# Patient Record
(demographics unavailable — no encounter records)

---

## 2024-12-23 NOTE — HISTORY OF PRESENT ILLNESS
[de-identified] : Age: 90 year F PMHx: HTN, HLD, Thyroid Disease Hand Dominance: RHD Chief Complaint: left hand ring trigger finger Trauma: Patient reports having pain and locking for one month. Patient reports having constant dull pain. Patient reports her finger "popping" into extension. Patient reports being able to use her hand with no restriction. Outside Imaging/Treatment: none OTC Medications: Tylenol PRN OT/PT: none Bracing: none Pain worse with: extension Pain better with: Tylenol

## 2024-12-23 NOTE — ASSESSMENT
[FreeTextEntry1] : EXAM Left hand with no swelling or erythema. Able to make fist, oppose thumb to small finger with good thenar bulk. No intrinsic atrophy. Tender along distal palmar crease, most notable at ring finger. No overt locking with catching palpable. Sensation intact throughout with <2sec cap refill.   ASSESSMENT & PLAN Left ring finger trigger - Discussed with patient the pathoanatomy of trigger and options going forward. Risks/benefits discussed as well as natural progression that injection will provide some relief but symptoms may recur. Discussed that pain may worsen in coming days but will subside. Discussed that we can repeat an injection or that operative intervention may be indicated down the line. After discussion of risks/benefits, including glucose intolerance in the diabetic population, patient elected to proceed with CSI to the A1 pulley.  Procedure 1 - 0.5cc 1% lidocaine + 0.5cc 40mg/cc Kenalog administered to the left ring finger A1 pulley following sterilization with Betadine. Patient tolerated this well.  F/u 3mo

## 2024-12-23 NOTE — HISTORY OF PRESENT ILLNESS
[de-identified] : Age: 90 year F PMHx: HTN, HLD, Thyroid Disease Hand Dominance: RHD Chief Complaint: left hand ring trigger finger Trauma: Patient reports having pain and locking for one month. Patient reports having constant dull pain. Patient reports her finger "popping" into extension. Patient reports being able to use her hand with no restriction. Outside Imaging/Treatment: none OTC Medications: Tylenol PRN OT/PT: none Bracing: none Pain worse with: extension Pain better with: Tylenol

## 2025-04-02 NOTE — PHYSICAL EXAM
[Right] : right knee [de-identified] : Constitutional: The patient appears well developed, well nourished. Examination of patients ability to communicate functionally was normal.       Neurologic: Coordination is normal. Alert and oriented to time, place and person. No evidence of mood disorder, calm affect.           RIGHT  KNEE: Inspection of the knee is as follows: mild effusion. no ecchymosis, no streaking, no erythema, no atrophy, no deformities of the quad tendon and no deformities of patellar tendon.   POPLITEAL SWELLING NOTED    Palpation of the knee is as follows: medial joint line tenderness and POPLITEAL TTP . no obvious defects, no palpable masses, no increased warmth and no crepitus.       Knee Range of Motion is as follows in degrees:       Extension: 0    Flexion: 95      Strength examination of the knee is as follows:       Quadriceps strength is 5/5   Hamstring strength is 5/5       Ligament Stability and Special Test:  ligamentously stable, negative anterior draw, negative Lachman test, negative posterior draw and no varus or valgus instability. patella tracks well and able to do active straight leg raise without an extensor lag.       Neurological examination of the knee is as follows: light touch is intact throughout.       Gait and function is as follows: non-antalgic gait.  [FreeTextEntry9] : ap/lat/sunrise show med/lateral chondrocalcinosis. mild medial joint space narrowing.

## 2025-04-02 NOTE — DISCUSSION/SUMMARY
[de-identified] : Diagnosis Knee Osteoarthritis   HAO NAVA 91 year  F was seen and evaluated in the office today. Following evaluation, and history of the patient's condition at length, the pathology was explained in full to the patient in layman's terms. Patient has Knee Osteoarthritis. Osteoarthritis is a degenerative joint disease where the cartilage in the knee gradually wears away, leading to pain, stiffness, and reduced mobility. Initially, symptoms may be mild, however this is a progressive condition, and the pain is expected to become more severe and persistent. Advanced stages of knee OA can result in significant disability, making daily activities challenging. I discussed with the patient that since this condition is expected to continue to worsen, they will eventually need a total knee replacement in their life time.   In the interim, discussed with patient several different treatment options regarding managing the gradual loss of function associated with knee OA, along with specific risks and benefits. Nonsurgical options including but not limited to Corticosteroid Injection, Visco Supplementation, Meloxicam 15mg, Medrol Dose Pack, along with activity modification such as non-impact exercise and organized physical therapy. The risk of morbidity associated with proposed treatments were discussed.   Furthermore, discussed with HAO that they could also delay any immediate treatment options and continue to observe and self-care for the discussed problem. Discussed Home Exercise Programs as well as Rest, Ice and elevation. Patient had ample time to ask any questions about todays visit and the diagnosis, and all questions were answered. Patient verbally expressed they understand the plan going forward. --  Options were discussed. She will finish the medrol dose pack and continue to ice and elevate.  Discussed future CSI, cyst aspiration. Discussed further with patient that with cyst aspiration, there is a chance of recurrent fluid.   f/u in 1 mos or PRN.   Entered by Clayton Alston acting as scribe. Dr. Reynoso Attestation The documentation recorded by the scribe, in my presence, accurately reflects the service I, Dr. Reynoso, personally performed, and the decisions made by me with my edits as appropriate.

## 2025-04-02 NOTE — HISTORY OF PRESENT ILLNESS
[de-identified] : 04/02/2025  HAO NAVA 91 year y/o F presents today for right knee pain Date of Onset: 03/31/25 Mechanism of injury: NKI   Pain:    At Rest: 10/10    With Activity: 10/10   Have you been treated for this in the past? Patient reports she was seen in [facility] ER on 04/01/25. Patient reports she had an XR of the knee and US of the right knee demonstrating a bakers cyst. Patient reports she was prescribed MDP.  OTC/Rx Medication: MDP with mild relief. Heat, Ice, Elevation:  Previous Imaging/Studies: XR at [facility]

## 2025-04-28 NOTE — DISCUSSION/SUMMARY
[de-identified] : Diagnosis Knee Osteoarthritis   HAO NAVA 91 year  F was seen and evaluated in the office today. Following evaluation, and history of the patient's condition at length, the pathology was explained in full to the patient in layman's terms. Patient has Knee Osteoarthritis. Osteoarthritis is a degenerative joint disease where the cartilage in the knee gradually wears away, leading to pain, stiffness, and reduced mobility. Initially, symptoms may be mild, however this is a progressive condition, and the pain is expected to become more severe and persistent. Advanced stages of knee OA can result in significant disability, making daily activities challenging. I discussed with the patient that since this condition is expected to continue to worsen, they will eventually need a total knee replacement in their life time.   In the interim, discussed with patient several different treatment options regarding managing the gradual loss of function associated with knee OA, along with specific risks and benefits. Nonsurgical options including but not limited to Corticosteroid Injection, Visco Supplementation, Meloxicam 15mg, Medrol Dose Pack, along with activity modification such as non-impact exercise and organized physical therapy. The risk of morbidity associated with proposed treatments were discussed.   Furthermore, discussed with HAO that they could also delay any immediate treatment options and continue to observe and self-care for the discussed problem. Discussed Home Exercise Programs as well as Rest, Ice and elevation. Patient had ample time to ask any questions about todays visit and the diagnosis, and all questions were answered. Patient verbally expressed they understand the plan going forward. --  Options were discussed. She will f/u PRN

## 2025-04-28 NOTE — PHYSICAL EXAM
[de-identified] : Constitutional: The patient appears well developed, well nourished. Examination of patients ability to communicate functionally was normal.       Neurologic: Coordination is normal. Alert and oriented to time, place and person. No evidence of mood disorder, calm affect.           RIGHT  KNEE: Inspection of the knee is as follows: mild effusion. no ecchymosis, no streaking, no erythema, no atrophy, no deformities of the quad tendon and no deformities of patellar tendon.   POPLITEAL SWELLING NOTED    Palpation of the knee is as follows: medial joint line tenderness and POPLITEAL TTP . no obvious defects, no palpable masses, no increased warmth and no crepitus.       Knee Range of Motion is as follows in degrees:       Extension: 0    Flexion: 105      Strength examination of the knee is as follows:       Quadriceps strength is 5/5   Hamstring strength is 5/5       Ligament Stability and Special Test:  ligamentously stable, negative anterior draw, negative Lachman test, negative posterior draw and no varus or valgus instability. patella tracks well and able to do active straight leg raise without an extensor lag.       Neurological examination of the knee is as follows: light touch is intact throughout.       Gait and function is as follows: non-antalgic gait.

## 2025-04-28 NOTE — HISTORY OF PRESENT ILLNESS
[de-identified] : 04/28/2025  HAO NAVA 91 year y/o F presents today for follow up on right knee pain.  Treatments since last visit: Patient reports she finished the MDP with great relief, ice, and elevation with relief Changes Since Last Visit: Patient reports her right knee feels significantly improved since last visit. Patient reports she feels well at this time.   04/02/2025  HAO NAVA 91 year y/o F presents today for right knee pain Date of Onset: 03/31/25 Mechanism of injury: NKI   Pain:    At Rest: 10/10    With Activity: 10/10   Have you been treated for this in the past? Patient reports she was seen in [facility] ER on 04/01/25. Patient reports she had an XR of the knee and US of the right knee demonstrating a bakers cyst. Patient reports she was prescribed MDP.  OTC/Rx Medication: MDP with mild relief. Heat, Ice, Elevation:  Previous Imaging/Studies: XR at [facility]

## 2025-06-02 NOTE — PHYSICAL EXAM
[de-identified] : Constitutional: The patient appears well developed, well nourished. Examination of patients ability to communicate functionally was normal.       Neurologic: Coordination is normal. Alert and oriented to time, place and person. No evidence of mood disorder, calm affect.           RIGHT  KNEE: Inspection of the knee is as follows: mild effusion. no ecchymosis, no streaking, no erythema, no atrophy, no deformities of the quad tendon and no deformities of patellar tendon.   POPLITEAL SWELLING NOTED    Palpation of the knee is as follows: medial joint line tenderness and POPLITEAL TTP . no obvious defects, no palpable masses, no increased warmth and no crepitus.       Knee Range of Motion is as follows in degrees:       Extension: 0    Flexion: 105      Strength examination of the knee is as follows:       Quadriceps strength is 5/5   Hamstring strength is 5/5       Ligament Stability and Special Test:  ligamentously stable, negative anterior draw, negative Lachman test, negative posterior draw and no varus or valgus instability. patella tracks well and able to do active straight leg raise without an extensor lag.       Neurological examination of the knee is as follows: light touch is intact throughout.       Gait and function is as follows: non-antalgic gait.

## 2025-06-02 NOTE — HISTORY OF PRESENT ILLNESS
[de-identified] : 06/02/2025  HAO NAVA 91 year y/o F presents today for follow up on right knee pain. Patient reports pain in lateral aspect of right knee.  Treatments since last visit: Ice 4 x a day. Tylenol PRN with some relief  Changes Since Last Visit: Patient reports still in pain.   04/28/2025  HAO NAVA 91 year y/o F presents today for follow up on right knee pain.  Treatments since last visit: Patient reports she finished the MDP with great relief, ice, and elevation with relief Changes Since Last Visit: Patient reports her right knee feels significantly improved since last visit. Patient reports she feels well at this time.   04/02/2025  HAO Bailey year y/o F presents today for right knee pain Date of Onset: 03/31/25 Mechanism of injury: NKI   Pain:    At Rest: 10/10    With Activity: 10/10   Have you been treated for this in the past? Patient reports she was seen in [facility] ER on 04/01/25. Patient reports she had an XR of the knee and US of the right knee demonstrating a bakers cyst. Patient reports she was prescribed MDP.  OTC/Rx Medication: MDP with mild relief. Heat, Ice, Elevation:  Previous Imaging/Studies: XR at [facility]

## 2025-06-02 NOTE — DISCUSSION/SUMMARY
[de-identified] : Diagnosis Knee Osteoarthritis   HAO NAVA 91 year  F was seen and evaluated in the office today. Following evaluation, and history of the patient's condition at length, the pathology was explained in full to the patient in layman's terms. Patient has Knee Osteoarthritis. Osteoarthritis is a degenerative joint disease where the cartilage in the knee gradually wears away, leading to pain, stiffness, and reduced mobility. Initially, symptoms may be mild, however this is a progressive condition, and the pain is expected to become more severe and persistent. Advanced stages of knee OA can result in significant disability, making daily activities challenging. I discussed with the patient that since this condition is expected to continue to worsen, they will eventually need a total knee replacement in their life time.   In the interim, discussed with patient several different treatment options regarding managing the gradual loss of function associated with knee OA, along with specific risks and benefits. Nonsurgical options including but not limited to Corticosteroid Injection, Visco Supplementation, Meloxicam 15mg, Medrol Dose Pack, along with activity modification such as non-impact exercise and organized physical therapy. The risk of morbidity associated with proposed treatments were discussed.   Furthermore, discussed with HAO that they could also delay any immediate treatment options and continue to observe and self-care for the discussed problem. Discussed Home Exercise Programs as well as Rest, Ice and elevation. Patient had ample time to ask any questions about todays visit and the diagnosis, and all questions were answered. Patient verbally expressed they understand the plan going forward.   Patient was given a CSI to the RIGHT KNEE, advised to ice if sore and will observe over the next 24 hours for any redness, swelling or increased discomfort. The indication for this procedure was pain and inflammation. The site was prepped with betadine and sterile technique used. An injection of Lidocaine 5cc of 1% was used Betamethasone (Celestone) 1cc of 6mg.  The patient tolerated procedure well. They were advised to call if redness, pain or fever occur and apply ice for 15 minutes out of every hour for the next 12-24 hours as tolerated. The risks benefits, and alternatives have been discussed. These risks include infection, hemarthrosis, allergic reaction, bleeding and hyperglycemia. Verbal understanding and consent was obtained. There is a moderate risk of morbidity with further treatment, especially from use of prescription strength medication.   The patient f/u in 1 month.   Entered by Rowena Nguyen acting as scribe. Dr. Reynoso Attestation The documentation recorded by the scribe, in my presence, accurately reflects the service I, Dr. Reynoso, personally performed, and the decisions made by me with my edits as appropriate.